# Patient Record
Sex: MALE | Race: BLACK OR AFRICAN AMERICAN | URBAN - METROPOLITAN AREA
[De-identification: names, ages, dates, MRNs, and addresses within clinical notes are randomized per-mention and may not be internally consistent; named-entity substitution may affect disease eponyms.]

---

## 2023-08-30 ENCOUNTER — OFFICE VISIT (OUTPATIENT)
Dept: URBAN - METROPOLITAN AREA CLINIC 27 | Facility: CLINIC | Age: 28
End: 2023-08-30

## 2023-10-27 ENCOUNTER — OFFICE VISIT (OUTPATIENT)
Dept: URBAN - METROPOLITAN AREA CLINIC 70 | Facility: CLINIC | Age: 28
End: 2023-10-27

## 2023-11-20 ENCOUNTER — OFFICE VISIT (OUTPATIENT)
Dept: URBAN - METROPOLITAN AREA CLINIC 70 | Facility: CLINIC | Age: 28
End: 2023-11-20
Payer: SELF-PAY

## 2023-11-20 VITALS
BODY MASS INDEX: 20.84 KG/M2 | DIASTOLIC BLOOD PRESSURE: 91 MMHG | WEIGHT: 145.6 LBS | HEART RATE: 75 BPM | SYSTOLIC BLOOD PRESSURE: 139 MMHG | TEMPERATURE: 99.9 F | HEIGHT: 70 IN

## 2023-11-20 DIAGNOSIS — K86.0 ALCOHOL-INDUCED CHRONIC PANCREATITIS: ICD-10-CM

## 2023-11-20 DIAGNOSIS — R11.0 NAUSEA ALONE: ICD-10-CM

## 2023-11-20 DIAGNOSIS — R10.13 EPIGASTRIC ABDOMINAL PAIN: ICD-10-CM

## 2023-11-20 PROBLEM — 235952002: Status: ACTIVE | Noted: 2023-11-20

## 2023-11-20 PROCEDURE — 99204 OFFICE O/P NEW MOD 45 MIN: CPT | Performed by: REGISTERED NURSE

## 2023-11-20 RX ORDER — OMEPRAZOLE 40 MG/1
1 CAPSULE CAPSULE, DELAYED RELEASE ORAL ONCE A DAY
Qty: 30 | Refills: 3 | OUTPATIENT
Start: 2023-11-20

## 2023-11-20 RX ORDER — PROMETHAZINE HYDROCHLORIDE 25 MG/1
TAKE 1 TABLET BY MOUTH THREE TIMES DAILY AS NEEDED MAY CAUSE DROWSINESS TABLET ORAL
Qty: 30 EACH | Refills: 0 | Status: ACTIVE | COMMUNITY

## 2023-11-20 RX ORDER — NIFEDIPINE 60 MG/1
TAKE 1 TABLET BY MOUTH EVERY DAY TABLET, EXTENDED RELEASE ORAL
Qty: 30 EACH | Refills: 0 | Status: ACTIVE | COMMUNITY

## 2023-11-20 RX ORDER — TRAZODONE HYDROCHLORIDE 50 MG/1
TAKE 1 TABLET BY MOUTH AT BEDTIME TABLET ORAL
Qty: 30 EACH | Refills: 0 | Status: ACTIVE | COMMUNITY

## 2023-11-20 RX ORDER — ONDANSETRON HYDROCHLORIDE 8 MG/1
1 TABLET TABLET, FILM COATED ORAL ONCE A DAY
Qty: 30 TABLET | Refills: 3 | OUTPATIENT
Start: 2023-11-20

## 2023-11-20 NOTE — HPI-TODAY'S VISIT:
Pt here for hospital f/u. He was admitted to [facility] from 6/30/23 to 7/4/23 with acute ETOH pancreatitis. MRI on 7/1/23 showed a large hemorrhagic structure in the pancreatic head and changes of acute on chronic pancreatitis with multiple fluid collections. CTA on 7/2/23 also showed acute on chronic pancreatitis with multiple fluid collections and a large hemorrhgic collection in the pancreatic head that was unchanged in sized compare to imaging on 6/30/23. He reports persistent epigastric abdominal pain and nausea since his hospital stay in July. Symptoms are not worsened by eating. He lester constipation or diarrhea. He stopped drinking a month ago.

## 2024-01-03 ENCOUNTER — OFFICE VISIT (OUTPATIENT)
Dept: URBAN - METROPOLITAN AREA CLINIC 70 | Facility: CLINIC | Age: 29
End: 2024-01-03

## 2024-01-03 RX ORDER — ONDANSETRON HYDROCHLORIDE 8 MG/1
1 TABLET TABLET, FILM COATED ORAL ONCE A DAY
Qty: 30 TABLET | Refills: 3 | Status: ACTIVE | COMMUNITY
Start: 2023-11-20

## 2024-01-03 RX ORDER — OMEPRAZOLE 40 MG/1
1 CAPSULE CAPSULE, DELAYED RELEASE ORAL ONCE A DAY
Qty: 30 | Refills: 3 | Status: ACTIVE | COMMUNITY
Start: 2023-11-20

## 2024-01-03 RX ORDER — TRAZODONE HYDROCHLORIDE 50 MG/1
TAKE 1 TABLET BY MOUTH AT BEDTIME TABLET ORAL
Qty: 30 EACH | Refills: 0 | Status: ACTIVE | COMMUNITY

## 2024-01-03 RX ORDER — PROMETHAZINE HYDROCHLORIDE 25 MG/1
TAKE 1 TABLET BY MOUTH THREE TIMES DAILY AS NEEDED MAY CAUSE DROWSINESS TABLET ORAL
Qty: 30 EACH | Refills: 0 | Status: ACTIVE | COMMUNITY

## 2024-01-03 RX ORDER — NIFEDIPINE 60 MG/1
TAKE 1 TABLET BY MOUTH EVERY DAY TABLET, EXTENDED RELEASE ORAL
Qty: 30 EACH | Refills: 0 | Status: ACTIVE | COMMUNITY

## 2024-02-09 ENCOUNTER — OV EP (OUTPATIENT)
Dept: URBAN - METROPOLITAN AREA CLINIC 70 | Facility: CLINIC | Age: 29
End: 2024-02-09

## 2024-02-09 RX ORDER — PROMETHAZINE HYDROCHLORIDE 25 MG/1
TAKE 1 TABLET BY MOUTH THREE TIMES DAILY AS NEEDED MAY CAUSE DROWSINESS TABLET ORAL
Qty: 30 EACH | Refills: 0 | Status: ACTIVE | COMMUNITY

## 2024-02-09 RX ORDER — OMEPRAZOLE 40 MG/1
1 CAPSULE CAPSULE, DELAYED RELEASE ORAL ONCE A DAY
Qty: 30 | Refills: 3 | Status: ACTIVE | COMMUNITY
Start: 2023-11-20

## 2024-02-09 RX ORDER — ONDANSETRON HYDROCHLORIDE 8 MG/1
1 TABLET TABLET, FILM COATED ORAL ONCE A DAY
Qty: 30 TABLET | Refills: 3 | Status: ACTIVE | COMMUNITY
Start: 2023-11-20

## 2024-02-09 RX ORDER — NIFEDIPINE 60 MG/1
TAKE 1 TABLET BY MOUTH EVERY DAY TABLET, EXTENDED RELEASE ORAL
Qty: 30 EACH | Refills: 0 | Status: ACTIVE | COMMUNITY

## 2024-02-09 RX ORDER — TRAZODONE HYDROCHLORIDE 50 MG/1
TAKE 1 TABLET BY MOUTH AT BEDTIME TABLET ORAL
Qty: 30 EACH | Refills: 0 | Status: ACTIVE | COMMUNITY

## 2024-02-09 NOTE — HPI-TODAY'S VISIT:
Rx omeprazole Labs show normal CBC and CMP. Amylase is 331 and lipase is 171. Advised him to take a clear liquid diet for a few days.

## 2024-02-09 NOTE — HPI-OTHER HISTORIES
Note from OV 11/20/23: Pt here for hospital f/u. He was admitted to [facility] from 6/30/23 to 7/4/23 with acute ETOH pancreatitis. MRI on 7/1/23 showed a large hemorrhagic structure in the pancreatic head and changes of acute on chronic pancreatitis with multiple fluid collections. CTA on 7/2/23 also showed acute on chronic pancreatitis with multiple fluid collections and a large hemorrhgic collection in the pancreatic head that was unchanged in sized compare to imaging on 6/30/23. He reports persistent epigastric abdominal pain and nausea since his hospital stay in July. Symptoms are not worsened by eating. He lester constipation or diarrhea. He stopped drinking a month ago.\ --------------------------------------------------

## 2024-02-22 ENCOUNTER — OV EP (OUTPATIENT)
Dept: URBAN - METROPOLITAN AREA CLINIC 70 | Facility: CLINIC | Age: 29
End: 2024-02-22

## 2024-02-22 RX ORDER — PROMETHAZINE HYDROCHLORIDE 25 MG/1
TAKE 1 TABLET BY MOUTH THREE TIMES DAILY AS NEEDED MAY CAUSE DROWSINESS TABLET ORAL
Qty: 30 EACH | Refills: 0 | Status: ACTIVE | COMMUNITY

## 2024-02-22 RX ORDER — OMEPRAZOLE 40 MG/1
1 CAPSULE CAPSULE, DELAYED RELEASE ORAL ONCE A DAY
Qty: 30 | Refills: 3 | Status: ACTIVE | COMMUNITY
Start: 2023-11-20

## 2024-02-22 RX ORDER — NIFEDIPINE 60 MG/1
TAKE 1 TABLET BY MOUTH EVERY DAY TABLET, EXTENDED RELEASE ORAL
Qty: 30 EACH | Refills: 0 | Status: ACTIVE | COMMUNITY

## 2024-02-22 RX ORDER — ONDANSETRON HYDROCHLORIDE 8 MG/1
1 TABLET TABLET, FILM COATED ORAL ONCE A DAY
Qty: 30 TABLET | Refills: 3 | Status: ACTIVE | COMMUNITY
Start: 2023-11-20

## 2024-02-22 RX ORDER — TRAZODONE HYDROCHLORIDE 50 MG/1
TAKE 1 TABLET BY MOUTH AT BEDTIME TABLET ORAL
Qty: 30 EACH | Refills: 0 | Status: ACTIVE | COMMUNITY

## 2024-02-22 NOTE — HPI-OTHER HISTORIES
Note from OV 11/20/23: Pt here for hospital f/u. He was admitted to [facility] from 6/30/23 to 7/4/23 with acute ETOH pancreatitis. MRI on 7/1/23 showed a large hemorrhagic structure in the pancreatic head and changes of acute on chronic pancreatitis with multiple fluid collections. CTA on 7/2/23 also showed acute on chronic pancreatitis with multiple fluid collections and a large hemorrhgic collection in the pancreatic head that was unchanged in sized compare to imaging on 6/30/23. He reports persistent epigastric abdominal pain and nausea since his hospital stay in July. Symptoms are not worsened by eating. He lester constipation or diarrhea. He stopped drinking a month ago.\ -------------------------------------------------- Note from OV 2/9/24: Rx omeprazole Labs show normal CBC and CMP. Amylase is 331 and lipase is 171. Advised him to take a clear liquid diet for a few days. ----------------------------------------------

## 2024-03-08 ENCOUNTER — LAB (OUTPATIENT)
Dept: URBAN - METROPOLITAN AREA CLINIC 70 | Facility: CLINIC | Age: 29
End: 2024-03-08

## 2024-03-08 ENCOUNTER — OV EP (OUTPATIENT)
Dept: URBAN - METROPOLITAN AREA CLINIC 70 | Facility: CLINIC | Age: 29
End: 2024-03-08
Payer: SELF-PAY

## 2024-03-08 VITALS
WEIGHT: 136.8 LBS | TEMPERATURE: 98.5 F | SYSTOLIC BLOOD PRESSURE: 134 MMHG | HEIGHT: 70 IN | DIASTOLIC BLOOD PRESSURE: 84 MMHG | HEART RATE: 79 BPM | BODY MASS INDEX: 19.58 KG/M2

## 2024-03-08 DIAGNOSIS — R10.13 EPIGASTRIC ABDOMINAL PAIN: ICD-10-CM

## 2024-03-08 DIAGNOSIS — K86.0 ALCOHOL-INDUCED CHRONIC PANCREATITIS: ICD-10-CM

## 2024-03-08 PROCEDURE — 99214 OFFICE O/P EST MOD 30 MIN: CPT | Performed by: INTERNAL MEDICINE

## 2024-03-08 RX ORDER — PROMETHAZINE HYDROCHLORIDE 25 MG/1
TAKE 1 TABLET BY MOUTH THREE TIMES DAILY AS NEEDED MAY CAUSE DROWSINESS TABLET ORAL
Qty: 30 EACH | Refills: 0 | Status: DISCONTINUED | COMMUNITY

## 2024-03-08 RX ORDER — TRAZODONE HYDROCHLORIDE 50 MG/1
TAKE 1 TABLET BY MOUTH AT BEDTIME TABLET ORAL
Qty: 30 EACH | Refills: 0 | Status: DISCONTINUED | COMMUNITY

## 2024-03-08 RX ORDER — NIFEDIPINE 60 MG/1
TAKE 1 TABLET BY MOUTH EVERY DAY TABLET, EXTENDED RELEASE ORAL
Qty: 30 EACH | Refills: 0 | Status: ACTIVE | COMMUNITY

## 2024-03-08 RX ORDER — OMEPRAZOLE 40 MG/1
1 CAPSULE CAPSULE, DELAYED RELEASE ORAL ONCE A DAY
OUTPATIENT
Start: 2023-11-20

## 2024-03-08 RX ORDER — ONDANSETRON HYDROCHLORIDE 8 MG/1
1 TABLET TABLET, FILM COATED ORAL ONCE A DAY
Qty: 30 TABLET | Refills: 3 | Status: ACTIVE | COMMUNITY
Start: 2023-11-20

## 2024-03-08 RX ORDER — OMEPRAZOLE 40 MG/1
1 CAPSULE CAPSULE, DELAYED RELEASE ORAL ONCE A DAY
Qty: 30 | Refills: 3 | Status: ACTIVE | COMMUNITY
Start: 2023-11-20

## 2024-03-08 NOTE — HPI-OTHER HISTORIES
Note from OV 11/20/23: Pt here for hospital f/u. He was admitted to [facility] from 6/30/23 to 7/4/23 with acute ETOH pancreatitis. MRI on 7/1/23 showed a large hemorrhagic structure in the pancreatic head and changes of acute on chronic pancreatitis with multiple fluid collections. CTA on 7/2/23 also showed acute on chronic pancreatitis with multiple fluid collections and a large hemorrhgic collection in the pancreatic head that was unchanged in sized compare to imaging on 6/30/23. He reports persistent epigastric abdominal pain and nausea since his hospital stay in July. Symptoms are not worsened by eating. He lester constipation or diarrhea. He stopped drinking a month ago.\ --------------------------------------------------

## 2024-03-08 NOTE — HPI-TODAY'S VISIT:
Rx omeprazole at LOV.  Labs 11/29/23 show normal CBC and CMP. Amylase is 331 and lipase is 171. Advised him to take a clear liquid diet for a few days. Labs 2/10/24 showed Lipase 170 US 3/4/24 showed an enlarged fatty liver and pancreatic pseudocysts. He continues to abstain from alcohol. He contines to have epigastric pain, indigestion, and belching. Pain is improving very slowly. It is worsened by eating.

## 2024-03-08 NOTE — PHYSICAL EXAM GASTROINTESTINAL
Abdomen , soft, + epigastric tenderness, nondistended , no guarding or rigidity , no masses palpable , normal bowel sounds , Liver and Spleen , no hepatomegaly present , liver nontender

## 2024-04-03 ENCOUNTER — EGD (OUTPATIENT)
Dept: URBAN - METROPOLITAN AREA SURGERY CENTER 24 | Facility: SURGERY CENTER | Age: 29
End: 2024-04-03

## 2024-05-01 ENCOUNTER — OFFICE VISIT (OUTPATIENT)
Dept: URBAN - METROPOLITAN AREA CLINIC 70 | Facility: CLINIC | Age: 29
End: 2024-05-01

## 2024-05-01 RX ORDER — OMEPRAZOLE 40 MG/1
1 CAPSULE CAPSULE, DELAYED RELEASE ORAL ONCE A DAY
Status: ACTIVE | COMMUNITY
Start: 2023-11-20

## 2024-05-01 RX ORDER — PANCRELIPASE LIPASE, PANCRELIPASE PROTEASE, PANCRELIPASE AMYLASE 40000; 126000; 168000 [USP'U]/1; [USP'U]/1; [USP'U]/1
AS DIRECTED CAPSULE, DELAYED RELEASE ORAL
Qty: 300 | Refills: 11 | Status: ACTIVE | COMMUNITY
Start: 2024-04-19 | End: 2025-04-14

## 2024-05-01 RX ORDER — NIFEDIPINE 60 MG/1
TAKE 1 TABLET BY MOUTH EVERY DAY TABLET, EXTENDED RELEASE ORAL
Qty: 30 EACH | Refills: 0 | Status: ACTIVE | COMMUNITY

## 2024-05-01 RX ORDER — ONDANSETRON HYDROCHLORIDE 8 MG/1
1 TABLET TABLET, FILM COATED ORAL ONCE A DAY
Qty: 30 TABLET | Refills: 3 | Status: ACTIVE | COMMUNITY
Start: 2023-11-20

## 2024-06-19 ENCOUNTER — TELEPHONE ENCOUNTER (OUTPATIENT)
Dept: URBAN - METROPOLITAN AREA CLINIC 70 | Facility: CLINIC | Age: 29
End: 2024-06-19